# Patient Record
Sex: MALE | Race: BLACK OR AFRICAN AMERICAN | Employment: UNEMPLOYED | URBAN - METROPOLITAN AREA
[De-identification: names, ages, dates, MRNs, and addresses within clinical notes are randomized per-mention and may not be internally consistent; named-entity substitution may affect disease eponyms.]

---

## 2019-04-03 ENCOUNTER — APPOINTMENT (OUTPATIENT)
Dept: GENERAL RADIOLOGY | Age: 19
End: 2019-04-03
Attending: PEDIATRICS
Payer: MEDICAID

## 2019-04-03 ENCOUNTER — HOSPITAL ENCOUNTER (EMERGENCY)
Age: 19
Discharge: HOME | End: 2019-04-03
Attending: PEDIATRICS
Payer: MEDICAID

## 2019-04-03 VITALS
WEIGHT: 246.91 LBS | TEMPERATURE: 98.2 F | DIASTOLIC BLOOD PRESSURE: 75 MMHG | HEART RATE: 93 BPM | OXYGEN SATURATION: 97 % | SYSTOLIC BLOOD PRESSURE: 138 MMHG | RESPIRATION RATE: 18 BRPM

## 2019-04-03 DIAGNOSIS — S62.92XA CLOSED FRACTURE OF LEFT HAND, INITIAL ENCOUNTER: Primary | ICD-10-CM

## 2019-04-03 PROCEDURE — 74011250637 HC RX REV CODE- 250/637: Performed by: PEDIATRICS

## 2019-04-03 PROCEDURE — 99283 EMERGENCY DEPT VISIT LOW MDM: CPT

## 2019-04-03 PROCEDURE — A4565 SLINGS: HCPCS

## 2019-04-03 PROCEDURE — 73130 X-RAY EXAM OF HAND: CPT

## 2019-04-03 PROCEDURE — 75810000053 HC SPLINT APPLICATION

## 2019-04-03 RX ORDER — NAPROXEN 250 MG/1
500 TABLET ORAL
Status: DISCONTINUED | OUTPATIENT
Start: 2019-04-03 | End: 2019-04-03

## 2019-04-03 RX ORDER — IBUPROFEN 600 MG/1
600 TABLET ORAL
Status: COMPLETED | OUTPATIENT
Start: 2019-04-03 | End: 2019-04-03

## 2019-04-03 RX ADMIN — IBUPROFEN 600 MG: 600 TABLET, FILM COATED ORAL at 16:39

## 2019-04-03 NOTE — ED PROVIDER NOTES
HPI  
 
 
History of present illness: The patient is a 78-year-old male previously well who presents with complaints of left hand pain. He states he was in his usual state of good health was angry this morning and hit a metal door with his left hand at 10 AM. He states since that time he has had increased swelling and pain with movement of his hand. No numbness no weakness. He is not taking any medications for pain. No other complaints no head injury no neck pain no trouble breathing no chest pain no abdominal pain no nausea no vomiting. The patient is right-hand dominant. No Other complaints no modifying factors no other concerns Review of systems:   A 10 point review is conducted. All pertinent Positive and negatives are as stated in the history of present illness Allergies: Nuts peanuts Immunizations: Up to date Medications: Albuterol  EpiPen Atrovent Flonase Singulair prednisone p.r.n. Past medical history: Positive for asthma Family history: Noncontributory to this illness Social history: Patient works as a musician lives with family denies smoking or drug use Past Medical History:  
Diagnosis Date  Asthma  Migraine Past Surgical History:  
Procedure Laterality Date  HX HEENT    
 right eye  HX ORTHOPAEDIC    
 ingrown toe nail x2 Family History:  
Problem Relation Age of Onset  Asthma Mother  Hypertension Mother  Other Mother   
     vertigo  Diabetes Father  Asthma Father  Diabetes Maternal Grandmother  Stroke Maternal Grandmother  Hypertension Maternal Grandfather  Other Maternal Grandfather   
     multiple myeloma  Diabetes Paternal Grandfather Social History Socioeconomic History  Marital status: SINGLE Spouse name: Not on file  Number of children: Not on file  Years of education: Not on file  Highest education level: Not on file Occupational History  Not on file Social Needs  Financial resource strain: Not on file  Food insecurity:  
  Worry: Not on file Inability: Not on file  Transportation needs:  
  Medical: Not on file Non-medical: Not on file Tobacco Use  Smoking status: Never Smoker  Smokeless tobacco: Never Used Substance and Sexual Activity  Alcohol use: No  
 Drug use: Not on file  Sexual activity: Not on file Lifestyle  Physical activity:  
  Days per week: Not on file Minutes per session: Not on file  Stress: Not on file Relationships  Social connections:  
  Talks on phone: Not on file Gets together: Not on file Attends [employer] service: Not on file Active member of club or organization: Not on file Attends meetings of clubs or organizations: Not on file Relationship status: Not on file  Intimate partner violence:  
  Fear of current or ex partner: Not on file Emotionally abused: Not on file Physically abused: Not on file Forced sexual activity: Not on file Other Topics Concern  Not on file Social History Narrative ** Merged History Encounter ** ALLERGIES: Nut flavor and Peanut Review of Systems Constitutional: Negative for activity change, appetite change and fever. HENT: Negative for sore throat. Eyes: Negative for redness and visual disturbance. Respiratory: Negative for cough, shortness of breath and wheezing. Cardiovascular: Negative for chest pain. Gastrointestinal: Negative for abdominal pain, diarrhea and vomiting. Genitourinary: Negative for difficulty urinating. Musculoskeletal: Positive for arthralgias. Skin: Negative for rash. Neurological: Negative for dizziness and weakness. All other systems reviewed and are negative. Vitals:  
 04/03/19 1628 BP: 138/75 Pulse: 93 Resp: 18 Temp: 98.2 °F (36.8 °C) SpO2: 97% Weight: 112 kg (246 lb 14.6 oz) Physical Exam  
Nursing note and vitals reviewed.  
PE: 
 GEN:  WDWN male alert non toxic in NAD talkative interactive well appearing SK: CRT < 2 sec, good distal pulses. No lesions, no rashes HEENT: H: AT/NC. E: EOMI , PERRL, E: TM clear  N/T: Clear oropharynx NECK: supple, no meningismus. No pain on palpation Chest: Clear to auscultation, clear BS. NO rales, rhonchi, wheezes or distress. No   Retraction. Chest Wall: no tenderness on palpation CV: Regular rate and rhythm. Normal S1 S2 . No murmur, gallops or thrills ABD: Soft non tender, no hepatomegaly, good bowel sound, no guarding, benign MS: FROM all extremities, no long bone tenderness. No swelling, cyanosis, no edema. Good distal pulses. Gait normal 
Left hand: + STS over 4=5th MC, + pain on palp  + pain on movement of digitis with referred pain to lateral hand. Good distal pulses, neurovasc intact NEURO: Alert. No focality. Cranial nerves 2-12 grossly intact. GCS 15  Behavior and mentation appropriate for age MDM Number of Diagnoses or Management Options Closed fracture of left hand, initial encounter:  
Diagnosis management comments: Medical decision making: 
 
Differential diagnosis includes: Contusion fracture X-ray: Positive nondisplaced fracture of fifth metacarpal 
 
Patient placed in a gutter ulnar splint by nursing sling dispensed. See their note for specifics Contact information for orthopedics provided to them to followup in 3-5 days. Patient's results have been reviewed with them. Patient and /or family have verbally conveyed understanding and agreement of the patient's signs, symptoms, diagnosis, treatment and prognosis and additionally agree to follow up as recommended or return to the Emergency Department should their condition change prior to follow-up.   Discharge instructions have also been provided to the patient with some educational information regarding their diagnosis as well as a list of reasons why they would want to return to the ER prior to their follow-up appointment should their condition change. Clinical impression: 
 fracture of left hand Amount and/or Complexity of Data Reviewed Tests in the radiology section of CPT®: ordered and reviewed Independent visualization of images, tracings, or specimens: yes Procedures

## 2019-04-03 NOTE — DISCHARGE INSTRUCTIONS
Follow up with Orthopedics in 4-5 days. Contact information is provided below for Orthopedics. Return to the emergency department for any worsening symptoms, any trouble breathing, swelling /discoloration of fingers/numbness or other new concerns. Broken Hand: Care Instructions  Your Care Instructions  A hand can break (fracture) during sports, a fall, or other accidents. The break may happen when your hand twists, is hit, or is used to protect you in a fall. Fractures can range from a small, hairline crack, to a bone or bones broken into two or more pieces. Your treatment depends on how bad the break is. Your doctor may have put your hand in a brace, splint, or cast to allow it to heal or to keep it stable until you see another doctor. It may take weeks or months for your hand to heal. You can help it heal with some care at home. You heal best when you take good care of yourself. Eat a variety of healthy foods, and don't smoke. Follow-up care is a key part of your treatment and safety. Be sure to make and go to all appointments, and call your doctor if you are having problems. It's also a good idea to know your test results and keep a list of the medicines you take. How can you care for yourself at home? · Put ice or a cold pack on your hand for 10 to 20 minutes at a time. Try to do this every 1 to 2 hours for the next 3 days (when you are awake). Put a thin cloth between the ice and your cast or splint. Keep your cast or splint dry. · Follow the cast care instructions your doctor gives you. If you have a splint, do not take it off unless your doctor tells you to. · Be safe with medicines. Take pain medicines exactly as directed. ? If the doctor gave you a prescription medicine for pain, take it as prescribed. ? If you are not taking a prescription pain medicine, ask your doctor if you can take an over-the-counter medicine.   · Prop up your hand on pillows when you sit or lie down in the first few days after the injury. Keep your hand higher than the level of your heart. This will help reduce swelling. · Follow instructions for exercises to keep your arm strong. · Wiggle your uninjured fingers often to reduce swelling and stiffness, but do not use that hand to grasp or carry anything. When should you call for help? Call your doctor now or seek immediate medical care if:    · You have new or worse pain.     · Your hand or fingers are cool or pale or change color.     · Your cast or splint feels too tight.     · You have tingling, weakness, or numbness in your hand or fingers.    Watch closely for changes in your health, and be sure to contact your doctor if:    · You do not get better as expected.     · You have problems with your cast or splint. Where can you learn more? Go to http://yadiel-julia.info/. Enter [phone] [phone] in the search box to learn more about \"Broken Hand: Care Instructions. \"  Current as of: September 20, 2018  Content Version: 11.9  © 2842-1685 [employer]. Care instructions adapted under license by [employer] (which disclaims liability or warranty for this information). If you have questions about a medical condition or this instruction, always ask your healthcare professional. [place] [address] any warranty or liability for your use of this information. Patient Education     Caring for Your Splint: After Your Visit  Your Care Instructions     A splint protects a broken bone or other injury. If you have a removable splint, follow your doctor's instructions and only remove the splint if your doctor says you can. Most splints can be adjusted. Your doctor will show you how to do this and will tell you when you might need to adjust the splint. Many splints are premade. Your doctor may also make a splint from plaster or fiberglass. Some splints have a built-in air cushion.  Air pads are inflated to hold the injured area in place.  Follow-up care is a key part of your treatment and safety. Be sure to make and go to all appointments, and call your doctor if you are having problems. It's also a good idea to know your test results and keep a list of the medicines you take. How can you care for yourself at home? General care  · Don't put any weight on a splint. If you have a walking boot, your doctor will tell you when you can put weight on it. · If the fingers or toes on the limb with the splint were not injured, wiggle them every now and then. This helps move the blood and fluids in the injured limb. · Prop up the injured arm or leg on a pillow when you ice it or anytime you sit or lie down during the next 3 days. Try to keep it above the level of your heart. This will help reduce swelling. · Put ice or cold packs on the hurt area for 10 to 20 minutes at a time. Try to do this every 1 to 2 hours for the next 3 days (when you are awake) or until the swelling goes down. Be careful not to get the splint wet. · Be safe with medicines. Read and follow all instructions on the label. ¨ If the doctor gave you a prescription medicine for pain, take it as prescribed. ¨ If you are not taking a prescription pain medicine, ask your doctor if you can take an over-the-counter medicine. · Keep up your muscle strength and tone as much as you can while protecting your injured limb or joint. Your doctor may want you to tense and relax the muscles protected by the splint. Check with your doctor or physical therapist for instructions. Splint and skin care  · If your splint is not to be removed, try blowing cool air from a hair dryer or fan into the splint to help relieve itching. Never stick items under your splint to scratch the skin. · Do not use oils or lotions near your splint. If the skin becomes red or sore around the edge of the splint, you may pad the edges with a soft material, such as moleskin, or use tape to cover the edges.   · If you're allowed to take your splint off, be sure your skin is dry before you put it back on. · Watch for pressure sores. These can develop over bony areas. Symptoms include a warm spot under the cast, pain, drainage, or an odor. Call your doctor if you think you have a pressure sore. · Watch for compartment syndrome. This happens when pressure builds up in a group of muscles, nerves, and blood vessels. It is an emergency. Symptoms include severe pain or tingling or numbness. Water and your splint  · Keep your splint dry. Moisture can collect under the splint and cause skin irritation and itching. If you have a wound or have had surgery, moisture under the splint can increase the risk of infection. · Cover your splint with at least two layers of plastic when you take a shower or bath, unless your doctor said you can take it off while bathing. · If you can take the splint off when you bathe, pat the area dry after bathing and put the splint back on.  · If your splint gets a little wet, you can dry it with a hair dryer. Use a \"cool\" setting. When should you call for help? Call your doctor now or seek immediate medical care if:  · You have increased or severe pain. · You feel a warm or painful spot under the splint. · You have problems with your splint. For example:  ¨ The skin under the splint burns or stings. ¨ The splint feels too tight. ¨ There is a lot of swelling near the splint. (Some swelling is normal.)  ¨ You have a new fever. ¨ There is drainage or a bad smell coming from the splint. · Your foot or hand is cool or pale or changes color. · You have trouble moving your fingers or toes. · You have symptoms of a blood clot in your arm or leg (called a deep vein thrombosis). These may include:  ¨ Pain in the arm, calf, back of the knee, thigh, or groin. ¨ Redness and swelling in the arm, leg, or groin.   Watch closely for changes in your health, and be sure to contact your doctor if:  · The splint is breaking apart or losing its shape. · You are not getting better as expected. Where can you learn more? Go to [employer]  Enter P405 in the search box to learn more about \"Caring for Your Splint: After Your Visit. \"   © 2472-3373 [employer]. Care instructions adapted under license by [employer] (which disclaims liability or warranty for this information). This care instruction is for use with your licensed healthcare professional. If you have questions about a medical condition or this instruction, always ask your healthcare professional. [place] [address] any warranty or liability for your use of this information. Content Version: 86.3.315691; Current as of: June 4, 2014             We hope that we have addressed all of your medical concerns. The examination and treatment you received in the Emergency Department were for an emergent problem and were not intended as complete care. It is important that you follow up with your healthcare provider(s) for ongoing care. If your symptoms worsen or do not improve as expected, and you are unable to reach your usual health care provider(s), you should return to the Emergency Department. Today's healthcare is undergoing tremendous change, and patient satisfaction surveys are one of the many tools to assess the quality of medical care. You may receive a survey from the [employer] organization regarding your experience in the Emergency Department. I hope that your experience has been completely positive, particularly the medical care that I provided. As such, please participate in the survey; anything less than excellent does not meet my expectations or intentions. [facility] and [facility] participate in nationally recognized quality of care measures.   If your blood pressure is greater than 120/80, as reported below, we urge that you seek medical care to address the potential of high blood pressure, commonly known as hypertension. Hypertension can be hereditary or can be caused by certain medical conditions, pain, stress, or \"white coat syndrome. \"       Please make an appointment with your health care provider(s) for follow up of your Emergency Department visit. VITALS:   Patient Vitals for the past 8 hrs:   Temp Pulse Resp BP SpO2   04/03/19 1628 98.2 °F (36.8 °C) 93 18 138/75 97 %          Thank you for allowing us to provide you with medical care today. We realize that you have many choices for your emergency care needs. Please choose us in the future for any continued health care needs. MD NATY Loyd [facility] [place] [facility]: [phone]            No results found for this or any previous visit (from the past 24 hour(s)). Xr Hand Lt Min 3 V    Result Date: 4/3/2019  EXAM: XR HAND LT MIN 3 V INDICATION: Trauma. Punched a wall COMPARISON: None. FINDINGS: Three views of the left hand. There is a subtle contour abnormality of the proximal fifth metacarpal with a thin lucent line. This represents an incomplete fracture of the fifth metacarpal base. No additional fracture. No dislocation. There is soft tissue swelling of the dorsum of the hand. IMPRESSION: Acute nondisplaced incomplete fracture of the fifth metacarpal base. Soft tissue swelling of the dorsum of the hand.

## 2019-04-03 NOTE — ED NOTES
EDUCATION: Patient education given on ortho follow up and the patient expresses understanding and acceptance of instructions.  Alessio Black RN 4/3/2019 5:19 PM

## 2020-03-09 ENCOUNTER — HOSPITAL ENCOUNTER (EMERGENCY)
Age: 20
Discharge: HOME | End: 2020-03-09
Attending: STUDENT IN AN ORGANIZED HEALTH CARE EDUCATION/TRAINING PROGRAM | Admitting: STUDENT IN AN ORGANIZED HEALTH CARE EDUCATION/TRAINING PROGRAM
Payer: MEDICAID

## 2020-03-09 VITALS
OXYGEN SATURATION: 98 % | HEART RATE: 76 BPM | DIASTOLIC BLOOD PRESSURE: 66 MMHG | TEMPERATURE: 98.1 F | RESPIRATION RATE: 18 BRPM | SYSTOLIC BLOOD PRESSURE: 119 MMHG

## 2020-03-09 DIAGNOSIS — T78.40XA ALLERGIC REACTION, INITIAL ENCOUNTER: Primary | ICD-10-CM

## 2020-03-09 PROCEDURE — 99284 EMERGENCY DEPT VISIT MOD MDM: CPT

## 2020-03-09 RX ORDER — PREDNISONE 20 MG/1
60 TABLET ORAL DAILY
Qty: 9 TAB | Refills: 0 | Status: SHIPPED | OUTPATIENT
Start: 2020-03-09 | End: 2020-03-12

## 2020-03-09 RX ORDER — EPINEPHRINE 0.3 MG/.3ML
0.3 INJECTION SUBCUTANEOUS
Qty: 1 SYRINGE | Refills: 3 | Status: SHIPPED | OUTPATIENT
Start: 2020-03-09 | End: 2020-03-09

## 2020-03-10 NOTE — ED PROVIDER NOTES
22 y.o. male with past medical history significant for asthma, migraines, and asthma who presents from home via EMS with chief complaint of itchy throat. Patient presents after eating chicken with peanut sauce and the patient is allergic to all nuts. Patient states this occurred around 7:30 pm. Patient endorses going to the pharmacy to get benadryl, r/t \"throat closing\", tingling in the lips and mouth, \"itchy throat\", mild SOB, and rash. Pateint then called EMS when he began with chest pain. Patient received dexamethazone and benadryl by EMS PTA. Patient denies any skin itching, nausea, vomiting, or diarrhea. There are no other acute medical concerns at this time. Social hx: Never Smoker, No EtOH use  PCP: Angelika Tellez MD      Note written by Moni Gonzalez, as dictated by Amrita Kilpatrick MD 8:45 PM      The history is provided by the patient. No  was used.         Past Medical History:   Diagnosis Date    Asthma     Migraine        Past Surgical History:   Procedure Laterality Date    HX HEENT      right eye    HX ORTHOPAEDIC      ingrown toe nail x2         Family History:   Problem Relation Age of Onset    Asthma Mother     Hypertension Mother     Other Mother         vertigo    Diabetes Father     Asthma Father     Diabetes Maternal Grandmother     Stroke Maternal Grandmother     Hypertension Maternal Grandfather     Other Maternal Grandfather         multiple myeloma    Diabetes Paternal Grandfather        Social History     Socioeconomic History    Marital status: SINGLE     Spouse name: Not on file    Number of children: Not on file    Years of education: Not on file    Highest education level: Not on file   Occupational History    Not on file   Social Needs    Financial resource strain: Not on file    Food insecurity:     Worry: Not on file     Inability: Not on file    Transportation needs:     Medical: Not on file     Non-medical: Not on file Tobacco Use    Smoking status: Never Smoker    Smokeless tobacco: Never Used   Substance and Sexual Activity    Alcohol use: No    Drug use: Not on file    Sexual activity: Not on file   Lifestyle    Physical activity:     Days per week: Not on file     Minutes per session: Not on file    Stress: Not on file   Relationships    Social connections:     Talks on phone: Not on file     Gets together: Not on file     Attends [employer] service: Not on file     Active member of club or organization: Not on file     Attends meetings of clubs or organizations: Not on file     Relationship status: Not on file    Intimate partner violence:     Fear of current or ex partner: Not on file     Emotionally abused: Not on file     Physically abused: Not on file     Forced sexual activity: Not on file   Other Topics Concern    Not on file   Social History Narrative    ** Merged History Encounter **              ALLERGIES: Nut flavor and Peanut    Review of Systems   HENT: Positive for sore throat, trouble swallowing and voice change. Respiratory: Positive for cough, chest tightness and shortness of breath. Cardiovascular: Positive for chest pain. Gastrointestinal: Negative for diarrhea, nausea and vomiting. Skin: Positive for rash. -itchy skin   All other systems reviewed and are negative. Vitals:    03/09/20 2030 03/09/20 2042 03/09/20 2044   BP: 135/87 121/63    Pulse: 80     Resp: 18     Temp: 98.1 °F (36.7 °C)     SpO2: 99% 100% 100%            Physical Exam  Vitals signs and nursing note reviewed. Constitutional:       Appearance: He is well-developed. He is not diaphoretic. HENT:      Head: Normocephalic and atraumatic. Mouth/Throat:      Pharynx: Posterior oropharyngeal erythema and uvula swelling (Slight) present. Neck:      Musculoskeletal: No neck rigidity. Pulmonary:      Effort: Pulmonary effort is normal.      Breath sounds: Normal breath sounds.       Comments: No stridor, clear lung sounds   Abdominal:      General: There is no distension. Tenderness: There is no abdominal tenderness. Skin:     General: Skin is warm. Findings: No rash. Neurological:      Mental Status: He is alert. Cranial Nerves: No cranial nerve deficit. Note written by Moni Scott, as dictated by Crystal Erickson MD 8:45 PM    MDM  Number of Diagnoses or Management Options  Diagnosis management comments: History of anaphylactic nut allergy with no previous history of intubation thankfully presenting with allergic reaction to peanuts about 1 and half hours ago. Gradually improving after receiving dexamethasone and Benadryl in route. 11:22 PM     Patient now has no symptoms, normal phonation, no oropharyngeal swelling. Given a refill of EpiPen, prednisone, follow-up with -- primary care.            Procedures

## 2020-03-10 NOTE — ED NOTES
Pt stated he feels much better. Reports big improvement subjectively.  Mother at bedside states his lips have decreased in swelling

## 2020-03-10 NOTE — ED TRIAGE NOTES
Pt presents with allergic reaction. Pt ate food at 730 that may have included peanuts, hx of allergy. Pt felt hard to swallow, lips tingling. Pt walked to [facility] to get benadryl, does not have epipen. Pt called EMS, did not appear swollen, no rashes in mouth, no auditory stridor. Pt was given 10 of benadryl and dex through 20G RAC.  Pt placed in waiting room

## 2021-07-12 ENCOUNTER — HOSPITAL ENCOUNTER (EMERGENCY)
Age: 21
Discharge: HOME | End: 2021-07-12
Attending: EMERGENCY MEDICINE
Payer: MEDICAID

## 2021-07-12 VITALS
WEIGHT: 272.49 LBS | RESPIRATION RATE: 18 BRPM | OXYGEN SATURATION: 98 % | TEMPERATURE: 98.5 F | HEART RATE: 92 BPM | SYSTOLIC BLOOD PRESSURE: 121 MMHG | DIASTOLIC BLOOD PRESSURE: 78 MMHG

## 2021-07-12 DIAGNOSIS — T78.2XXA ANAPHYLAXIS, INITIAL ENCOUNTER: Primary | ICD-10-CM

## 2021-07-12 LAB
COMMENT, HOLDF: NORMAL
SAMPLES BEING HELD,HOLD: NORMAL

## 2021-07-12 PROCEDURE — 74011250636 HC RX REV CODE- 250/636

## 2021-07-12 PROCEDURE — 74011000250 HC RX REV CODE- 250: Performed by: EMERGENCY MEDICINE

## 2021-07-12 PROCEDURE — 99285 EMERGENCY DEPT VISIT HI MDM: CPT

## 2021-07-12 PROCEDURE — 96374 THER/PROPH/DIAG INJ IV PUSH: CPT

## 2021-07-12 PROCEDURE — 96372 THER/PROPH/DIAG INJ SC/IM: CPT

## 2021-07-12 PROCEDURE — 96375 TX/PRO/DX INJ NEW DRUG ADDON: CPT

## 2021-07-12 PROCEDURE — 74011250636 HC RX REV CODE- 250/636: Performed by: EMERGENCY MEDICINE

## 2021-07-12 RX ORDER — EPINEPHRINE 0.3 MG/.3ML
0.3 INJECTION SUBCUTANEOUS
Qty: 2 SYRINGE | Refills: 0 | Status: SHIPPED | OUTPATIENT
Start: 2021-07-12 | End: 2021-07-12 | Stop reason: SDUPTHER

## 2021-07-12 RX ORDER — IPRATROPIUM BROMIDE AND ALBUTEROL SULFATE 2.5; .5 MG/3ML; MG/3ML
SOLUTION RESPIRATORY (INHALATION)
Status: DISCONTINUED
Start: 2021-07-12 | End: 2021-07-12 | Stop reason: HOSPADM

## 2021-07-12 RX ORDER — EPINEPHRINE 0.3 MG/.3ML
0.3 INJECTION SUBCUTANEOUS
Qty: 2 SYRINGE | Refills: 0 | Status: SHIPPED | OUTPATIENT
Start: 2021-07-12 | End: 2021-07-12

## 2021-07-12 RX ORDER — EPINEPHRINE 1 MG/ML
INJECTION, SOLUTION, CONCENTRATE INTRAVENOUS
Status: COMPLETED
Start: 2021-07-12 | End: 2021-07-12

## 2021-07-12 RX ORDER — PREDNISONE 20 MG/1
60 TABLET ORAL DAILY
Qty: 12 TABLET | Refills: 0 | Status: SHIPPED | OUTPATIENT
Start: 2021-07-12 | End: 2021-07-16

## 2021-07-12 RX ORDER — FAMOTIDINE 20 MG/1
20 TABLET, FILM COATED ORAL 2 TIMES DAILY
Qty: 10 TABLET | Refills: 0 | Status: SHIPPED | OUTPATIENT
Start: 2021-07-12 | End: 2021-07-17

## 2021-07-12 RX ORDER — EPINEPHRINE 1 MG/ML
0.5 INJECTION, SOLUTION, CONCENTRATE INTRAVENOUS
Status: DISCONTINUED | OUTPATIENT
Start: 2021-07-12 | End: 2021-07-12 | Stop reason: HOSPADM

## 2021-07-12 RX ORDER — EPINEPHRINE 1 MG/ML
0.5 INJECTION, SOLUTION, CONCENTRATE INTRAVENOUS
Status: COMPLETED | OUTPATIENT
Start: 2021-07-12 | End: 2021-07-12

## 2021-07-12 RX ORDER — PREDNISONE 20 MG/1
60 TABLET ORAL DAILY
Qty: 12 TABLET | Refills: 0 | Status: SHIPPED | OUTPATIENT
Start: 2021-07-12 | End: 2021-07-12 | Stop reason: SDUPTHER

## 2021-07-12 RX ORDER — DIPHENHYDRAMINE HCL 25 MG
CAPSULE ORAL
Qty: 25 CAPSULE | Refills: 0 | Status: SHIPPED | OUTPATIENT
Start: 2021-07-12

## 2021-07-12 RX ORDER — DIPHENHYDRAMINE HYDROCHLORIDE 50 MG/ML
50 INJECTION, SOLUTION INTRAMUSCULAR; INTRAVENOUS
Status: COMPLETED | OUTPATIENT
Start: 2021-07-12 | End: 2021-07-12

## 2021-07-12 RX ADMIN — METHYLPREDNISOLONE SODIUM SUCCINATE 125 MG: 125 INJECTION, POWDER, FOR SOLUTION INTRAMUSCULAR; INTRAVENOUS at 00:43

## 2021-07-12 RX ADMIN — EPINEPHRINE 0.5 MG: 1 INJECTION, SOLUTION, CONCENTRATE INTRAVENOUS at 00:39

## 2021-07-12 RX ADMIN — ALBUTEROL SULFATE 1 DOSE: 2.5 SOLUTION RESPIRATORY (INHALATION) at 00:37

## 2021-07-12 RX ADMIN — SODIUM CHLORIDE 1000 ML: 9 INJECTION, SOLUTION INTRAVENOUS at 00:49

## 2021-07-12 RX ADMIN — EPINEPHRINE 0.5 MG: 1 INJECTION, SOLUTION, CONCENTRATE INTRAVENOUS at 00:26

## 2021-07-12 RX ADMIN — DIPHENHYDRAMINE HYDROCHLORIDE 50 MG: 50 INJECTION INTRAMUSCULAR; INTRAVENOUS at 00:34

## 2021-07-12 RX ADMIN — FAMOTIDINE 20 MG: 10 INJECTION, SOLUTION INTRAVENOUS at 00:45

## 2021-07-12 NOTE — ED NOTES
Patient reports increased difficulty breathing. MD notified. Verbal order for second dose of Epi received from Dr. Ashley Tabares.

## 2021-07-12 NOTE — ED TRIAGE NOTES
Triage: patient allergic to nuts. Had milkshake that he believes had nuts in it. Didn't have his epipen so drove here.  Patient states he feels like his throat is closing up

## 2021-07-12 NOTE — ED NOTES
Pt discharged home. Pt acting age appropriately, respirations regular and unlabored, cap refill less than two seconds. Skin pink, dry and warm. Lungs clear bilaterally. No further complaints at this time. Pt verbalized understanding of discharge paperwork and has no further questions at this time. Education provided about continuation of care, follow up care and medication administration:Take medications as ordered by provider. Follow-up with PCP in the next few days. Return to ED for worsening symptoms . Pt able to provided teach back about discharge instructions.

## 2021-07-12 NOTE — LETTER
[address]  [address] [facility] DEPT  [address]  [address]  [phone]    Work/School Note    Date: 7/12/2021    To Whom It May concern:    Marybeth Euceda was seen and treated today in the emergency room by the following provider(s):  Attending Provider: Ward Ronquillo MD.      Marybeth Euceda may return to work on Wednesday July 14, 2021.     Sincerely,          [employer]

## 2021-07-12 NOTE — ED PROVIDER NOTES
HPI     Please note that this dictation was completed with [employer], the computer voice recognition software. Quite often unanticipated grammatical, syntax, homophones, and other interpretive errors are inadvertently transcribed by the computer software. Please disregard these errors. Please excuse any errors that have escaped final proofreading. 75-year-old male with a prior history of anaphylaxis to peanuts here with severe allergic reaction occurring just prior to arrival.  Patient states that he ordered a mocha milkshake at [facility]. He took 1 sip of it and began experiencing throat closing sensation similar to prior peanut allergy. Patient then began vomiting and having difficulty breathing. Patient came directly to the emergency department. He states that he typically has an EpiPen which is close to expiring but did not have it with him. No meds prior to arrival.  History limited by acuity of condition and patient distress.     Past Medical History:   Diagnosis Date    Asthma     Migraine        Past Surgical History:   Procedure Laterality Date    HX HEENT      right eye    HX ORTHOPAEDIC      ingrown toe nail x2         Family History:   Problem Relation Age of Onset    Asthma Mother     Hypertension Mother     Other Mother         vertigo    Diabetes Father     Asthma Father     Diabetes Maternal Grandmother     Stroke Maternal Grandmother     Hypertension Maternal Grandfather     Other Maternal Grandfather         multiple myeloma    Diabetes Paternal Grandfather        Social History     Socioeconomic History    Marital status: SINGLE     Spouse name: Not on file    Number of children: Not on file    Years of education: Not on file    Highest education level: Not on file   Occupational History    Not on file   Tobacco Use    Smoking status: Never Smoker    Smokeless tobacco: Never Used   Substance and Sexual Activity    Alcohol use: No    Drug use: Not on file    Sexual activity: Not on file   Other Topics Concern    Not on file   Social History Narrative    ** Merged History Encounter **          Social Determinants of Health     Financial Resource Strain:     Difficulty of Paying Living Expenses:    Food Insecurity:     Worried About Running Out of Food in the Last Year:     Ran Out of Food in the Last Year:    Transportation Needs:     Lack of Transportation (Medical):  Lack of Transportation (Non-Medical):    Physical Activity:     Days of Exercise per Week:     Minutes of Exercise per Session:    Stress:     Feeling of Stress :    Social Connections:     Frequency of Communication with Friends and Family:     Frequency of Social Gatherings with Friends and Family:     Attends Yarsanism Services:     Active Member of Clubs or Organizations:     Attends Club or Organization Meetings:     Marital Status:    Intimate Partner Violence:     Fear of Current or Ex-Partner:     Emotionally Abused:     Physically Abused:     Sexually Abused: ALLERGIES: Nut flavor and Peanut    Review of Systems   Unable to perform ROS: Acuity of condition   Constitutional: Negative for fever. HENT: Positive for sore throat. Respiratory: Positive for shortness of breath. Gastrointestinal: Positive for nausea and vomiting. Vitals:    07/12/21 0100 07/12/21 0130 07/12/21 0200 07/12/21 0230   BP:       Pulse: (!) 110 (!) 112 (!) 101 100   Resp: 21 21 19 17   Temp:       SpO2: 98% 98% 99% 95%   Weight:                Physical Exam  Vitals and nursing note reviewed. Constitutional:       General: He is in acute distress. Appearance: He is well-developed. He is obese. He is toxic-appearing. Comments: Vomiting, appears sob   HENT:      Head: Normocephalic and atraumatic. Comments: Facial swelling     Nose: No congestion or rhinorrhea. Mouth/Throat:      Mouth: Mucous membranes are moist.      Pharynx: No oropharyngeal exudate.       Comments: Normal uvula. Mild swelling of tongue. Eyes:      General: No scleral icterus. Right eye: No discharge. Left eye: No discharge. Conjunctiva/sclera: Conjunctivae normal.   Cardiovascular:      Rate and Rhythm: Normal rate and regular rhythm. Heart sounds: Normal heart sounds. No murmur heard. No friction rub. No gallop. Pulmonary:      Effort: Pulmonary effort is normal. No respiratory distress. Breath sounds: Wheezing present. No rales. Abdominal:      General: There is no distension. Palpations: Abdomen is soft. Tenderness: There is no abdominal tenderness. There is no guarding. Musculoskeletal:         General: No tenderness. Normal range of motion. Cervical back: Normal range of motion and neck supple. Lymphadenopathy:      Cervical: No cervical adenopathy. Skin:     General: Skin is warm and dry. Coloration: Skin is not pale. Findings: No rash. Neurological:      Mental Status: He is alert and oriented to person, place, and time. Cranial Nerves: No cranial nerve deficit. Coordination: Coordination normal.          [facility]  ED Course as of Jul 12 0257   Mon Jul 12, 2021   0145 Patient is feeling better. No longer vomiting. Sats 99%. Patient still states that his throat feels a little sore. Tongue feels better. Lungs are clear. No longer chest feeling tight. Patient trending with improving condition. Continue to observe. [RG]   0146 Normal speaking and voice    [RG]      ED Course User Index  [RG] Chirag Harry MD     78-year-old male here with severe anaphylaxis with vomiting and difficulty breathing. Throat closing sensation. Give IM  Procedures      12:39 AM  Patient was improving now feels like his throat is getting tight again. Will give repeat epinephrine as it has been almost 15 minutes since last dose. Benadryl and steroids already given. Pepcid to be given. Patient with mild expiratory wheezing. Ordered neb. 115 am  Patient is feeling better. Less tight feeling in his chest and throat. No longer vomiting. I have spent 35 minutes of critical care time involved in lab review, consultations with specialist, family decision-making, and documentation. During this entire length of time I was immediately available to the patient. Critical Care: The reason for providing this level of medical care for this critically ill patient was due a critical illness that impaired one or more vital organ systems such that there was a high probability of imminent or life threatening deterioration in the patients condition. This care involved high complexity decision making to assess, manipulate, and support vital system functions, to treat this degreee vital organ system failure and to prevent further life threatening deterioration of the patients condition. 5:15 AM  She feels completely better. No difficulty breathing or throat symptoms. No vomiting. Patient tolerated p.o. well. Will discharge home. 5:16 AM  Patient's results have been reviewed with them. Patient and/or family have verbally conveyed their understanding and agreement of the patient's signs, symptoms, diagnosis, treatment and prognosis and additionally agree to follow up as recommended or return to the Emergency Room should their condition change prior to follow-up. Discharge instructions have also been provided to the patient with some educational information regarding their diagnosis as well a list of reasons why they would want to return to the ER prior to their follow-up appointment should their condition change.

## 2021-07-12 NOTE — ED NOTES
Patient reports feeling increase ease of breathing. Patient and mom educated on plan of care regarding observation for 6 hours. Patient verbalizes understanding of plan of care. Patient and mom provided pillows and blankets. No further needs expressed at this time.

## 2021-07-12 NOTE — DISCHARGE INSTRUCTIONS
Take benadryl 50 mg (2 over the counter tabs) every 6 hours    Start the prednisone this morning. Take Pepcid over the counter 20 mg twice a day while taking the steroids.

## 2021-07-12 NOTE — ED NOTES
Patient asleep in stretcher. Mom at bedside. Patient breathing even and unlabored. Patient in no apparent distress. No needs expressed at this time.

## 2021-07-12 NOTE — ED NOTES
Gave patient gingerale to drink. No signs of distress noted at this time. Pt. Denies any difficulty breathing or swallowing at this time. Pt. Remains on cardiac and respiratory monitor.

## 2021-07-12 NOTE — ED NOTES
Bedside and Verbal shift change report given to Stephanie Mitchell RN (oncoming nurse) by Prince Maldonado RN (offgoing nurse). Report included the following information SBAR, ED Summary and Intake/Output.

## 2021-07-12 NOTE — ED NOTES
Patient asleep in stretcher. Mom at bedside. No needs expressed. Patient breathing even and unlabored. O2 sats 96% on room air. Patient in no apparent distress.

## 2021-07-12 NOTE — ED NOTES
Patient now sleeping in stretcher with breathing even and unlabored. Patient on monitor x3. Patient on nasal cannula 2L. Mom at bedside. No needs expressed at this time.

## 2021-07-12 NOTE — ED NOTES
Patient taken off O2. Patient resting in stretcher, breathing even and unlabored. Patient in no apparent distress.

## 2022-10-24 ENCOUNTER — HOSPITAL ENCOUNTER (OUTPATIENT)
Dept: NUTRITION | Age: 22
Discharge: HOME | End: 2022-10-24
Payer: MEDICAID

## 2022-10-24 PROCEDURE — 97802 MEDICAL NUTRITION INDIV IN: CPT | Performed by: DIETITIAN, REGISTERED

## 2022-10-24 NOTE — PROGRESS NOTES
[id number] [facility]     Nutrition Assessment - Medical Nutrition Therapy   Outpatient Initial Evaluation         Patient Name: Yuniel Ramirez : 2000   Treatment Diagnosis: Obesity, BMI > 50, Z713 Dietary Counseling    Referral Source: Luz Velasquez MD [facility]): 10/24/2022     In time:   10:30am         Out time:   11:30am   Total Treatment Time (min):   60     Gender: male Age: 23 y.o. Ht: 63 in Wt:  293 lb  kg   BMI: 51.9 BMR   Male 2214 BMR Female      Past Medical History:  Nut allergy (all nuts)    Patient Active Problem List   Diagnosis Code    Constipation - functional K59.04    Anal sphincter tear (healed) (old) K62.81    Rectal bleeding K62.5        Pertinent Medications:   Epi-pen  Zyrtec allergy  Flonase  Dulera  ProAir  Ipratropium bromide  Naproxen       Biochemical Data:   No results found for: HBA1C, ISZ6TZPO, MXX6RIAX  Lab Results   Component Value Date/Time    Sodium 141 2016 10:17 PM    Potassium 3.3 (L) 2016 10:17 PM    Chloride 107 2016 10:17 PM    CO2 26 2016 10:17 PM    Anion gap 8 2016 10:17 PM    Glucose 182 (H) 2016 10:17 PM    BUN 16 2016 10:17 PM    Creatinine 1.21 2016 10:17 PM    BUN/Creatinine ratio 13 2016 10:17 PM    GFR est AA >60 2016 10:17 PM    GFR est non-AA >60 2016 10:17 PM    Calcium 9.2 2016 10:17 PM     No results found for: CHOL, CHOLPOCT, CHOLX, CHLST, CHOLV, TOTCHOLEXT, HDL, HDLPOC, HDLEXT, HDLP, LDL, LDLCPOC, LDLCEXT, LDLC, DLDLP, VLDLC, VLDL, TGLX, TRIGL, TRIGLYCEXT, TRIGP, TGLPOCT, CHHD, CHHDX     Assessment:   Pt is a 17yo male here for help with weight loss. Histoyr of successful weight loss of 88# over 1 year back at the end of college (). Lowest weight was 200#. Able to maintain for 8-9 mo. Then weight regain to 265#. Notes has fallen off of exercise. [place] best at 220#    Ntoes he was motivated then.  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 Working out 4-5 times per week. Eating on campus had healthy options. When went home stopped exercising and food changed. Notes weight gain when waorking at a seafood place with fried options. Some times of pigging out and went crazy. 30 count nuggets, sandwich, fries, .. big portions of protein vs starches. Would not eat until feeling full or sick. Notes a difference between satisfied vs feeling full. Has now returned to  eating full instead of satisfied. Was doing better with eating throughout the day. Not a big snacker. Missing meals currently. Working overnights. 4 days per week. 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 Mostly sedentary. Standing when working for 3 hours. Activity:slighly adding back gym 3 times per week for past 3 weeks. Has used an jena for logging in the past. Easier to log it as the day went. Sometimes would change his order. Found it more helpful for macro counting for a full day. During weight loss challenges  for 2-3 weeks at a time. Food & Nutrition: Living with mom. Mom is main cook / shared. Mom is supportive of weight loss goals. EBT application in. Schedule Changes often. Usually off on sundays. Always works Monday and Saturday nights. No work =   Wake up morning. Minimal intake. Cook breakfast (enjoys when has time. Eggs (3 with cheese) + rock (3 slices or sausage) with fruit (blueberries, grapes, strawberries), OJ (16oz) - eating to little past satisfied. Sometimes eating fast. Trying to slow himself down to 20-30min with TV. Grabbing food if home like lunch (chicken with few vegetables OR sausage. No drinks. Afternoon rehearsals (fast food after = 4x4 OR chick citlalli A strips + fries OR salad shepherd with 5 count nuggets - grilled or fried. With the avocado dressing) if a meal will have a drink - regular sweetened drink.    Eat after rehearsal 8pm-10pm  Going to work 11pm-8am  At work = eating 4-5pm (cooked dinner bringing with him. Or grab a full meal at a restaurant)  Once in a while order a pizza. Eating 1 time at work. Some snacks = chips singles. Drinks: sometimes soda (coke or sprite 1 per night)  Occasionally will eat breakfast (oatmeal and eggs or eggs and rock)  Go to sleep after. Waking up after at 2-3pm.     Drinks on the go = sodas coke or dr. Sofia Soliz. Sometimes SSB. M&Ms as a snack. Lately buying groceries more often to rely on food out less. Trying to get into the habit of cooking more often. Planning on packing up food for at work. Usually cooking enough for 2-3 days. Alcohol = liquor 3-4 drinks 1-2 days per week + mixers (juices lik OJ 16oz)  Wine 6oz 2 glasses per 2 weeks. Some fruit punch / lemonade at home = 3-4 x 16oz 3 days per week. Water   Rare slushies, slurpies, milkshakess. Ice cream = 3-4 times per week if at home. Generally every 1 time per month. Out for ice cream. Less now. 1-2 times per week. 3 scoops at a time. Less often with waffle. [facility]. Estimate Needs:    Equation( [x] MSJ ; []  HBE; [] Marion Caro; [] other)  * Activity Factor (1.4-1.5) -500   Calories: 2500  Protein: 125 Carbs: 313 Fat: 83   Kcal/day  g/day  g/day  g/day        percent: 20  50  30               Nutrition Diagnosis Obesity R/T excessive energy intake AEB BMI>30    Excessive energy intake R/T Food and nutrition related knowledge deficit for calories in SSB AEB dietary recall showing high calorie intake from beverages daily, BMI>30     Nutrition Intervention &  Education: Educated pt on the basics of healthy weight loss and the rationale for dietary modifications and increased activity. Educated pt on lean proteins, healthy fats, non-starchy vegetables, and complex carbohydrate food sources. Discussed moderating  carbohydrates, label reading, meal timing, and appropriate serving sizes. Encouraged pt to avoid sugary beverages. Recommend food log for awareness 3 times per week. Educated on hunger/fullness. Handouts Provided: []  Carbohydrates  []  Protein  []  Non-starchy Vegatbles  []  Food Label  [x]  Meal and Snack Ideas  [x]  Food Journals []  Diabetes  []  Cholesterol  []  Sodium  []  Gen Nutr Guidelines  []  SBGM Guidelines  [x]  Others:SSB   Information Reviewed with: pt   Readiness to Change Stage: []  Pre-contemplative    []  Contemplative  [x]  Preparation               []  Action                  []  Maintenance   Potential Barriers to Learning: []  Decline in memory    []  Language barrier   []  Other:  []  Emotional                  []  Limited mobility     [x]  None  []  Lack of motivation     [] Vision, hearing or cognitive impairment   Expected Compliance: Good due to high motivation     Nutritional Goal - To promote lifestyle changes to result in:    [x]  Weight loss  []  Improved diabetic control  []  Decreased cholesterol levels  []  Decreased blood pressure  []  Weight maintenance []  Preventing any interactions associated with food allergies  []  Adequate weight gain toward goal weight  []  Other:        Patient Goals:   - limit SSB to 2 x 16oz per day  -stop eating when satisfied (should take 20min minimum)  - Increase accountability and awareness of food choices by recording food and beverage intake by using a food journal at least 3 days per week.        Dietitian Signature: Thor Shelton MS, RD, CSSD Date: 10/24/2022   Follow-up: 2 weeks Time: 10:37 AM

## 2022-11-07 ENCOUNTER — HOSPITAL ENCOUNTER (OUTPATIENT)
Dept: NUTRITION | Age: 22
Discharge: HOME | End: 2022-11-07
Payer: MEDICAID

## 2022-11-07 PROCEDURE — 97803 MED NUTRITION INDIV SUBSEQ: CPT | Performed by: DIETITIAN, REGISTERED

## 2022-11-07 NOTE — PROGRESS NOTES
NUTRITION - FOLLOW-UP TREATMENT NOTE  Patient Name: Kamlesh Huff         Date: 2022  : 2000    YES Patient  Verified  Diagnosis: Obesity, BMI > 50, Z713 Dietary Counseling    In time:   11:15pm         Out time:   11:50am   Total Treatment Time (min):   35     SUBJECTIVE/ASSESSMENT  Current Wt: 292.4 Previous Wt: 293 Wt Change: -0.6     Initial Wt: 293 Total Wt change: -0.6 Height: 63     Changes in medication or medical history? Any new allergies, surgeries or procedures? No    If yes, update Summary List          Nutrition Diagnosis        Diagnosis Status:       Obesity R/T excessive energy intake AEB BMI>30     [x]  Improved []  No Change    []  Declined   []  Discontinued       Excessive energy intake R/T Food and nutrition related knowledge deficit for calories in SSB AEB dietary recall showing high calorie intake from beverages daily, BMI>30    [x]  Improved []  No Change    []  Declined   []  Discontinued        Nutrition Monitoring and Evaluation: Tracking not very consistently   Working overnight. Less eating at work due to fullness from previous meals. Eating less over 24hrs. Wants to track more consistently for instant accountability. Doing better with adjusting meals and alcohol intake. Has questions about upcoming family meal out and thanksgiving. Notes wanting to shift focus to ordering food out based on healthier options instead of just eating less of preference directed choices. Ate out last week and able to catch himself eating past satisfied. Able to pack up leftovers and bring them home. Proud of this change. Previous goals:  Met. - limit SSB to 2 x 16oz per day  Caught self. -stop eating when satisfied (should take 20min minimum)  Inconsistent. - Increase accountability and awareness of food choices by recording food and beverage intake by using a food journal at least 3 days per week.         Nutrition Prescription and  Intervention CBT and MI  Goal setting  Educated on healthier choices eating out and reading menus. Educated on holiday eating improvements. Recommend being active on the holiday, choosing smaller portions, not eating to full, and lilmiting calorie beverages. Hunger fullness awareness  Self monitoring with food log       Patient Education:  [x]  Review current plan with patient   []  Other:    Handouts/  Information Provided: []  Carbohydrates  []  Protein  []  Fiber  []  Serving Sizes  []  Fluids  []  General guidelines []  Diabetes  []  Cholesterol  []  Sodium  []  SBGM  []  Food Journals  []  Others:      Patient Goals -look up menu options for restaurant befroe going. Look for key words to choose lower calorie options and stop at satisfied.   -plan for Thanksgiving meal: activity, smaller portions, only eating to satisfied.   - limit SSB to 2 x 16oz per day or less. -stop eating when satisfied (should take 20min minimum)  - Increase accountability and awareness of food choices by recording food and beverage intake by using a food journal at least 3 days per week.  -gym 3 days this week.       PLAN  [x]  Continue on current plan []  Follow-up PRN   []  Discharge due to :    [x]  Next appt: 2 weeks     Dietitian: Anurag Ramirez MS, RD, CSSD    Date: 11/7/2022 Time: 11:19 AM

## 2022-11-30 ENCOUNTER — HOSPITAL ENCOUNTER (OUTPATIENT)
Dept: NUTRITION | Age: 22
Discharge: HOME | End: 2022-11-30
Payer: MEDICAID

## 2022-11-30 PROCEDURE — 97803 MED NUTRITION INDIV SUBSEQ: CPT | Performed by: DIETITIAN, REGISTERED

## 2022-11-30 NOTE — PROGRESS NOTES
NUTRITION - FOLLOW-UP TREATMENT NOTE  Patient Name: Silke Ashby         Date: 2022  : 2000    YES Patient  Verified  Diagnosis: Obesity, BMI > 50, Z713 Dietary Counseling    In time: 10:36am       Out time:   11:06am   Total Treatment Time (min):   30     SUBJECTIVE/ASSESSMENT  Current Wt: 287.6 Previous Wt: 292.4 Wt Change: -4.8     Initial Wt: 293 Total Wt change: -5.4 Height: 63     Changes in medication or medical history? Any new allergies, surgeries or procedures? No    If yes, update Summary List          Nutrition Diagnosis        Diagnosis Status:   Obesity R/T excessive energy intake AEB BMI>30     [x]  Improved []  No Change    []  Declined   []  Discontinued     Excessive energy intake R/T Food and nutrition related knowledge deficit for calories in SSB AEB dietary recall showing high calorie intake from beverages daily, BMI>30    [x]  Improved []  No Change    []  Declined   []  Discontinued        Nutrition Monitoring and Evaluation: Looking at calorie amounts in foods and beverages being consumed. Adjusting choices based on information. .   Started with  and tracking food. Using [employer]. Averaging around 3897-1665 kcal per day. Less calorie sweated beverages. Salads with vinaigrettes. Lower calorie dressing choices. Getting grilled instead of fried items. No feeling of being restricted. Eating out more often than before. Wants to eat at home more often. Started using . Notes they recommended having a protein shake after workouts. Questions about best options. Previous goals:  Met. -look up menu options for restaurant befroe going. Look for key words to choose lower calorie options and stop at satisfied. Met. Went back for 2nds of lower calories choices. -plan for Thanksgiving meal: activity, smaller portions, only eating to satisfied. Met. - limit SSB to 2 x 16oz per day or less. Improved.  -stop eating when satisfied (should take 20min minimum)  Met - Increase accountability and awareness of food choices by recording food and beverage intake by using a food journal at least 3 days per week. met-gym 3 days this week. Nutrition Prescription and  Intervention CBT and MI  Goal setting  Educated on healthier choices eating out and reading menus. Educated on holiday eating improvements. Recommend being active on the holiday, choosing smaller portions, not eating to full, and lilmiting calorie beverages. Hunger fullness awareness  Self monitoring with food log  Provided recipe resources  Answered questions about protein shakes after workouts. Reviewed healthy oil options. Patient Education:  [x]  Review current plan with patient   []  Other:    Handouts/  Information Provided: []  Carbohydrates  []  Protein  []  Fiber  []  Serving Sizes  []  Fluids  []  General guidelines []  Diabetes  []  Cholesterol  []  Sodium  []  SBGM  []  Food Journals  []  Others:      Patient Goals -look up menu options for restaurant befroe going. Look for key words to choose lower calorie options and stop at satisfied.   -plan for Thanksgiving meal: activity, smaller portions, only eating to satisfied.   - limit SSB to 2 x 16oz per day or less. -stop eating when satisfied (should take 20min minimum)  - Increase accountability and awareness of food choices by recording food and beverage intake by using a food journal at least 3 days per week.  -gym 3 days this week.   -protein after workouts. 1st choice = have regular meal including protein right after workout. 2nd choice, low calorie protein shake right after (~20-30g protein) and replace additional calories with smaller breakfast option made at home.       PLAN  [x]  Continue on current plan []  Follow-up PRN   []  Discharge due to :    [x]  Next appt: 2-3 weeks     Dietitian: Rene Cardona MS, RD, CSSD    Date: 11/30/2022 Time: 10:36am

## 2023-01-09 ENCOUNTER — HOSPITAL ENCOUNTER (OUTPATIENT)
Dept: NUTRITION | Age: 23
Discharge: HOME | End: 2023-01-09
Payer: MEDICAID

## 2023-01-09 PROCEDURE — 97803 MED NUTRITION INDIV SUBSEQ: CPT | Performed by: DIETITIAN, REGISTERED

## 2023-01-09 NOTE — PROGRESS NOTES
NUTRITION - FOLLOW-UP TREATMENT NOTE  Patient Name: Silke Ashby         Date: 2023  : 2000    YES Patient  Verified  Diagnosis: Obesity, BMI > 50, Z713 Dietary Counseling    In time: 12:00pm      Out time:   12:30pm   Total Treatment Time (min):   30     SUBJECTIVE/ASSESSMENT  Current Wt: 281.2 Previous Wt: 287.6 Wt Change: -6.4     Initial Wt: 293 Total Wt change: -11.8 Height: 63     Changes in medication or medical history? Any new allergies, surgeries or procedures? No    If yes, update Summary List          Nutrition Diagnosis        Diagnosis Status:   Obesity R/T excessive energy intake AEB BMI>30     [x]  Improved []  No Change    []  Declined   []  Discontinued     Excessive energy intake R/T Food and nutrition related knowledge deficit for calories in SSB AEB dietary recall showing high calorie intake from beverages daily, BMI>30    [x]  Improved []  No Change    []  Declined   []  Discontinued        Nutrition Monitoring and Evaluation: Notes doing well with family holiday meals. Over last month notes a little more fried foods and sodas, but has now reduced that back down . Still did better with portions. Was not tracking after tawanda. Still able to pay attention to portions. Options don't always allow to have veggies. Upcoming challenge with traveling to [address] [place] [address]    Previous goals:  Met. -look up menu options for restaurant befroe going. Look for key words to choose lower calorie options and stop at satisfied. Met. -plan for Thanksgiving meal: activity, smaller portions, only eating to satisfied. Now 2-3 per week. Small time of more over holiday. - limit SSB to 2 x 16oz per day or less. Met. Few times eating past satisfied. May have more time till next meal. -stop eating when satisfied (should take 20min minimum)  Met. Trying now without tracking and focusing on portions.  - Increase accountability and awareness of food choices by recording food and beverage intake by using a food journal at least 3 days per week. Met 2 days per week. -gym 3 days this week. Met. Eating right after a full meal including protein. -protein after workouts. 1st choice = have regular meal including protein right after workout. 2nd choice, low calorie protein shake right after (~20-30g protein) and replace additional calories with smaller breakfast option made at home. Nutrition Prescription and  Intervention CBT and MI  Goal setting  Educated on healthier choices eating out and reading menus. Hunger fullness awareness  Self monitoring with food log - now using for looking up options instead of daily tracking. Patient Education:  [x]  Review current plan with patient   []  Other:    Handouts/  Information Provided: []  Carbohydrates  []  Protein  []  Fiber  []  Serving Sizes  []  Fluids  []  General guidelines []  Diabetes  []  Cholesterol  []  Sodium  []  SBGM  []  Food Journals  []  Others:      Patient Goals -look up menu options for restaurant befroe going. Look for key words to choose lower calorie options and stop at satisfied.   - limit SSB to 2 x 16oz per day or less. -stop eating when satisfied (should take 20min minimum)  -gym 3 days this week.   -try a zero regan beverage option. Recommended stevia based options.       PLAN  [x]  Continue on current plan []  Follow-up PRN   []  Discharge due to :    [x]  Next appt: 4-6 weeks     Dietitian: Camilla Justice MS, RD, CSSD    Date: 1/9/2023 Time: 10:36am

## 2023-02-20 ENCOUNTER — HOSPITAL ENCOUNTER (OUTPATIENT)
Dept: NUTRITION | Age: 23
Discharge: HOME | End: 2023-02-20
Payer: MEDICAID

## 2023-02-20 PROCEDURE — 97803 MED NUTRITION INDIV SUBSEQ: CPT | Performed by: DIETITIAN, REGISTERED

## 2023-02-20 NOTE — PROGRESS NOTES
NUTRITION - FOLLOW-UP TREATMENT NOTE  Patient Name: Solomon Wiley         Date: 2023  : 2000    YES Patient  Verified  Diagnosis: Obesity, BMI > 50, Z713 Dietary Counseling    In time: 8:35am    Out time: 9:00am   Total Treatment Time (min):  25     SUBJECTIVE/ASSESSMENT  Current Wt: 276.8 Previous Wt: 281. 2 Wt Change: -5.4     Initial Wt: 293 Total Wt change: -17.2 Height: 63     Changes in medication or medical history? Any new allergies, surgeries or procedures? No    If yes, update Summary List          Nutrition Diagnosis        Diagnosis Status:   Obesity R/T excessive energy intake AEB BMI>30     [x]  Improved []  No Change    []  Declined   []  Discontinued     Excessive energy intake R/T Food and nutrition related knowledge deficit for calories in SSB AEB dietary recall showing high calorie intake from beverages daily, BMI>30    [x]  Improved []  No Change    []  Declined   []  Discontinued        Nutrition Monitoring and Evaluation: [facility] Splitting up meals out for leftovers. No eating past full. Mostly water. 1 soda per week. More chaid tea from [employer]. 1 per week. No snacking. Making adjustments when eating out to choose lower calorie options than would have in the past.   Notes 2 times of eating past full since last visit. Confident in ability to continue changes. Discussed upcoming cruise in April and planning for food and drink options. Previous goals:  Met. ook up menu options for restaurant befroe going. Look for key words to choose lower calorie options and stop at satisfied. Met. 2 Per week/ - limit SSB to 2 x 16oz per day or less. Met. -stop eating when satisfied (should take 20min minimum)  2 times per week. -gym 3 days this week. Not met. -try a zero regan beverage option. Recommended stevia based options.          Nutrition Prescription and  Intervention CBT and MI  Goal setting  Educated on healthier choices eating out and reading menus. Hunger fullness awareness  Self monitoring with food log - now using for looking up options instead of daily tracking. Discussed potential barriers and contingency planning for cruise in April      Patient Education:  [x]  Review current plan with patient   []  Other:    Handouts/  Information Provided: []  Carbohydrates  []  Protein  []  Fiber  []  Serving Sizes  []  Fluids  []  General guidelines []  Diabetes  []  Cholesterol  []  Sodium  []  SBGM  []  Food Journals  []  Others:      Patient Goals -look up menu options for restaurant befroe going. Look for key words to choose lower calorie options and stop at satisfied.   - limit SSB to 2 x 16oz per day or less. -stop eating when satisfied (should take 20min minimum)  -gym 3 days this week.   -try a zero regan beverage option. Recommended stevia based options.       PLAN  [x]  Continue on current plan []  Follow-up PRN   []  Discharge due to :    [x]  Next appt: 4-6 weeks     Dietitian: Florencia Purcell MS, RD, LG    Date: 2/20/2023 Time: 9:00am

## 2023-03-20 ENCOUNTER — HOSPITAL ENCOUNTER (OUTPATIENT)
Dept: NUTRITION | Age: 23
Discharge: HOME | End: 2023-03-20
Payer: MEDICAID

## 2023-03-20 PROCEDURE — 97803 MED NUTRITION INDIV SUBSEQ: CPT | Performed by: DIETITIAN, REGISTERED

## 2023-03-20 NOTE — PROGRESS NOTES
NUTRITION - FOLLOW-UP TREATMENT NOTE  Patient Name: Hellen Olvera         Date: 3/20/2023  : 2000    YES Patient  Verified  Diagnosis: Obesity, BMI > 50, Z713 Dietary Counseling    In time: 8:45am    Out time: 9:00am   Total Treatment Time (min):  15     SUBJECTIVE/ASSESSMENT  Current Wt: 273.4 Previous Wt: 276.8 Wt Change: -3.4     Initial Wt: 293 Total Wt change: -20.6 Height: 63     Changes in medication or medical history? Any new allergies, surgeries or procedures? No    If yes, update Summary List          Nutrition Diagnosis        Diagnosis Status:   Obesity R/T excessive energy intake AEB BMI>30     [x]  Improved []  No Change    []  Declined   []  Discontinued     Excessive energy intake R/T Food and nutrition related knowledge deficit for calories in SSB AEB dietary recall showing high calorie intake from beverages daily, BMI>30    [x]  Improved []  No Change    []  Declined   []  Discontinued        Nutrition Monitoring and Evaluation: Cruise plans on hold. Went to [employer] and noticed started craving it after. Went back 3 times since last session. Going to the gym for 30 min classes + 20 min cardio. Parking father. Water and sodas. Noticed more soda recently. Alcohol minimal. Not weekly. 2 drinks or less. Previous goals:  Met. -look up menu options for restaurant befroe going. Look for key words to choose lower calorie options and stop at satisfied. Had been havign 2 days per week. Recently 3+. Plans to return. - limit SSB to 2 x 16oz per day or less. Met. -stop eating when satisfied (should take 20min minimum)  Met. -gym 3 days this week. Not tried. -try a zero regan beverage option. Recommended stevia based options. Nutrition Prescription and  Intervention CBT and MI  Goal setting  Educated on healthier choices eating out and reading menus. Hunger fullness awareness  Self monitoring with food log - now using for looking up options instead of daily tracking. Discussed potential barriers and contingency planning for cruise in April      Patient Education:  [x]  Review current plan with patient   []  Other:    Handouts/  Information Provided: []  Carbohydrates  []  Protein  []  Fiber  []  Serving Sizes  []  Fluids  []  General guidelines []  Diabetes  []  Cholesterol  []  Sodium  []  SBGM  []  Food Journals  []  Others:      Patient Goals -look up menu options for restaurant befroe going. Look for key words to choose lower calorie options and stop at satisfied.   - limit SSB to 2 x 16oz per day or less. 1-2 times per week  -stop eating when satisfied (should take 20min minimum)  -gym 3 days this week.       PLAN  [x]  Continue on current plan []  Follow-up PRN   []  Discharge due to :    [x]  Next appt: 4-6 weeks     Dietitian: Kristina Geller MS, RD, CSSD    Date: 3/20/2023 Time: 9:00am

## 2023-04-18 ENCOUNTER — APPOINTMENT (OUTPATIENT)
Dept: NUTRITION | Age: 23
End: 2023-04-18
Payer: MEDICAID

## 2023-04-24 ENCOUNTER — HOSPITAL ENCOUNTER (OUTPATIENT)
Dept: NUTRITION | Age: 23
Discharge: HOME | End: 2023-04-24
Payer: MEDICAID

## 2023-04-24 PROCEDURE — 97803 MED NUTRITION INDIV SUBSEQ: CPT | Performed by: DIETITIAN, REGISTERED

## 2023-04-24 NOTE — PROGRESS NOTES
NUTRITION - FOLLOW-UP TREATMENT NOTE  Patient Name: Luz Barajas         Date: 2023  : 2000    YES Patient  Verified  Diagnosis: Obesity, BMI > 50, Z713 Dietary Counseling    In time: 8:45am    Out time: 9:15am   Total Treatment Time (min):  30     SUBJECTIVE/ASSESSMENT  Current Wt: 274.4 Previous Wt: 273.4 Wt Change: +1     Initial Wt: 293 Total Wt change: -19.6 Height: 63     Changes in medication or medical history? Any new allergies, surgeries or procedures? No    If yes, update Summary List          Nutrition Diagnosis        Diagnosis Status:   Obesity R/T excessive energy intake AEB BMI>30     []  Improved [x]  No Change    []  Declined   []  Discontinued     Excessive energy intake R/T Food and nutrition related knowledge deficit for calories in SSB AEB dietary recall showing high calorie intake from beverages daily, BMI>30    [x]  Improved []  No Change    []  Declined   []  Discontinued        Nutrition Monitoring and Evaluation: Travel over past month. More fast food. Trying to reduce back town towards every other week. Trying to make better options. Working out in gym. Trainer 2 times per week. Will add in classes for 3rd day. Still parking farther. Notes consistent habit. Drinks: less or no sodas. Some juices 1 per day. More alcohol for birthday week. Trying to keep a count for drinks. When eating out, eating half of the plate provided. Passing on more fried items. Less sauces. Planning trip out [place]. Previous goals:  Met. -Look up menu options for restaurant befroe going. Look for key words to choose lower calorie options and stop at satisfied. Met - Soda 1 time per week. Juice 1 time per day. - limit SSB to 2 x 16oz per day or less. Met. -Stop eating when satisfied (should take 20min minimum)  Met - Exercise 3x per week -gym 3 days this week.          Nutrition Prescription and  Intervention CBT and MI  Goal setting  Reviewed healthier choices eating out. Hunger fullness awareness  Self monitoring with food log - now using for looking up options instead of daily tracking. Discussed potential barriers and planning for birthday meal out. Patient Education:  [x]  Review current plan with patient   []  Other:    Handouts/  Information Provided: []  Carbohydrates  []  Protein  []  Fiber  []  Serving Sizes  []  Fluids  []  General guidelines []  Diabetes  []  Cholesterol  []  Sodium  []  SBGM  []  Food Journals  []  Others:      Patient Goals - Bring water bottle when leaving the house to increase water intake and decrease soda intake. - at birthday dinner eat greens first, then lean meat to decrease eating past full. - Limit SSB to 2 x 16oz per day or less. 1-2 times per week. -Stop eating when satisfied (should take 20min minimum)  -Gym 3x per week. 2x workout and 1x workout class. - decrease Islas's to 1 time every other week >> return to other options Chick Jayden A salad, and [employer].       PLAN  [x]  Continue on current plan []  Follow-up PRN   []  Discharge due to :    [x]  Next appt: 4 weeks     Dietitian: Rojas Huerta MS, RD, CSSD    Date: 4/24/2023 Time: 9:15am

## 2023-05-01 VITALS
DIASTOLIC BLOOD PRESSURE: 77 MMHG | SYSTOLIC BLOOD PRESSURE: 162 MMHG | HEART RATE: 97 BPM | BODY MASS INDEX: 49.02 KG/M2 | HEIGHT: 63 IN | OXYGEN SATURATION: 97 % | WEIGHT: 276.68 LBS | TEMPERATURE: 98.6 F | RESPIRATION RATE: 20 BRPM

## 2023-05-01 PROCEDURE — 99283 EMERGENCY DEPT VISIT LOW MDM: CPT

## 2023-05-02 ENCOUNTER — HOSPITAL ENCOUNTER (EMERGENCY)
Age: 23
Discharge: HOME | End: 2023-05-02
Attending: EMERGENCY MEDICINE
Payer: MEDICAID

## 2023-05-02 DIAGNOSIS — M25.562 ACUTE PAIN OF LEFT KNEE: ICD-10-CM

## 2023-05-02 DIAGNOSIS — T14.8XXA MUSCLE STRAIN: ICD-10-CM
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
## 2023-05-02 PROCEDURE — 74011250637 HC RX REV CODE- 250/637: Performed by: EMERGENCY MEDICINE

## 2023-05-02 RX ORDER — METHOCARBAMOL 750 MG/1
750 TABLET, FILM COATED ORAL
Qty: 20 TABLET | Refills: 0 | Status: SHIPPED | OUTPATIENT
Start: 2023-05-02

## 2023-05-02 RX ORDER — NAPROXEN 250 MG/1
500 TABLET ORAL
Status: COMPLETED | OUTPATIENT
Start: 2023-05-02 | End: 2023-05-02

## 2023-05-02 RX ORDER — METHOCARBAMOL 750 MG/1
750 TABLET, FILM COATED ORAL
Status: COMPLETED | OUTPATIENT
Start: 2023-05-02 | End: 2023-05-02

## 2023-05-02 RX ORDER — NAPROXEN 500 MG/1
500 TABLET ORAL 2 TIMES DAILY WITH MEALS
Qty: 20 TABLET | Refills: 0 | Status: SHIPPED | OUTPATIENT
Start: 2023-05-02 | End: 2023-05-12

## 2023-05-02 RX ADMIN — NAPROXEN 500 MG: 250 TABLET ORAL at 01:55

## 2023-05-02 RX ADMIN — METHOCARBAMOL TABLETS 750 MG: 750 TABLET, COATED ORAL at 01:55

## 2023-05-02 NOTE — ED NOTES
DC info reviewed with Patient, all questions answered. Patient well-appearing at time of discharge, no distress reported or observed. Ambulatory out of ED at this time.

## 2023-05-22 ENCOUNTER — HOSPITAL ENCOUNTER (OUTPATIENT)
Facility: HOSPITAL | Age: 23
Setting detail: RECURRING SERIES
Discharge: HOME | End: 2023-05-25
Payer: MEDICAID

## 2023-05-22 PROCEDURE — 97803 MED NUTRITION INDIV SUBSEQ: CPT | Performed by: DIETITIAN, REGISTERED
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
